# Patient Record
(demographics unavailable — no encounter records)

---

## 2024-12-30 NOTE — IMAGING
[Right] : right knee [Moderate tricompartmental OA medial narrowing] : Moderate tricompartmental OA medial narrowing [Moderate patellofemoral OA] : Moderate patellofemoral OA

## 2024-12-30 NOTE — ASSESSMENT
[FreeTextEntry1] : Reviewed xrays and discussed treatemnt options. He is in not pain so declines CSI today. Aspirated 35cc, normal synovial fluid. Ice, ACE applied. Activity modfication. Follow up prn.

## 2024-12-30 NOTE — PHYSICAL EXAM
[Right] : right knee [NL (140)] : flexion 140 degrees [NL (0)] : extension 0 degrees [] : ligamentously stable

## 2024-12-30 NOTE — PROCEDURE
[Large Joint Injection] : Large joint injection [Right] : of the right [Pain] : pain [Inflammation] : inflammation [X-ray evidence of Osteoarthritis on this or prior visit] : x-ray evidence of Osteoarthritis on this or prior visit [Alcohol] : alcohol [Betadine] : betadine [Effusion] : effusion [] : Patient tolerated procedure well [Call if redness, pain or fever occur] : call if redness, pain or fever occur [Apply ice for 15min out of every hour for the next 12-24 hours as tolerated] : apply ice for 15 minutes out of every hour for the next 12-24 hours as tolerated [Risks, benefits, alternatives discussed / Verbal consent obtained] : the risks benefits, and alternatives have been discussed, and verbal consent was obtained [de-identified] : 35cc

## 2024-12-30 NOTE — HISTORY OF PRESENT ILLNESS
[Right Leg] : right leg [Occasional] : occasional [de-identified] : 12/30/24: 88 y/o male with right knee pain. pt states that he has had right knee pain since he was in his 50's due to several injuries when playing soccer. pt is here today due to swelling in his right knee. pt states that he was here in 2018 and had his knee drained.  [] : no [FreeTextEntry1] : knee  [de-identified] : motion  [de-identified] : aspiration

## 2025-01-07 NOTE — HISTORY OF PRESENT ILLNESS
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 bikes 12 miles daily without issues. 12/23, feeling well. tte showing mild to mod pulm htn. last seen 7/24 feeling ok, exercising regularly.   pt now presents for follow up. today,              denies CP, SOB, at rest or on exertion. Denies palpitations, dizziness, diaphoresis, syncope, LE edema, orthopnea no recent falls pt checks bp at home 120-130s systolic.  Exercise: daily stretching, bikes 12 miles. no symptoms. no decreased exercise tolerance Diet: none  Prev cardiac history: Previous cardiac testing: TTE 2020 LVEF 60%, normal rv function mod LAE Recent labs:  EKG: SR 57 APCs  Med hx: HTN HLD Sx hx: vocal polyp removed, hernia Family hx: B: CAD/MI, M: CAD/MI Social hx: originally from [place], lives in [place], alone. 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

## 2025-01-27 NOTE — PHYSICAL EXAM
[Well Developed] : well developed [Well Nourished] : well nourished [No Acute Distress] : no acute distress [Normal Venous Pressure] : normal venous pressure [Normal S1, S2] : normal S1, S2 [No Murmur] : no murmur [Clear Lung Fields] : clear lung fields [Good Air Entry] : good air entry [No Respiratory Distress] : no respiratory distress  [Soft] : abdomen soft [Non Tender] : non-tender [No Edema] : no edema [Moves all extremities] : moves all extremities [No Focal Deficits] : no focal deficits [Alert and Oriented] : alert and oriented [de-identified] : slow gait

## 2025-01-27 NOTE — REVIEW OF SYSTEMS
[Fever] : no fever [Headache] : no headache [Weight Gain (___ Lbs)] : no recent weight gain [Chills] : no chills [Feeling Fatigued] : not feeling fatigued [Weight Loss (___ Lbs)] : no recent weight loss [Blurry Vision] : no blurred vision [Sore Throat] : no sore throat [SOB] : no shortness of breath [Dyspnea on exertion] : not dyspnea during exertion [Chest Discomfort] : no chest discomfort [Lower Ext Edema] : no extremity edema [Palpitations] : no palpitations [Orthopnea] : no orthopnea [Syncope] : no syncope [Cough] : no cough [Wheezing] : no wheezing [Nausea] : no nausea [Vomiting] : no vomiting [Dizziness] : no dizziness [Easy Bleeding] : no tendency for easy bleeding [Easy Bruising] : no tendency for easy bruising

## 2025-01-27 NOTE — DISCUSSION/SUMMARY
[FreeTextEntry1] : 87M with HTN HLD presents for f/uu  pt feeling well euvolemic EKG showing SR pt exercising daily without issues BP controlled at home 130s systolic  f/u 6 months unless requires sooner 45 min spent on complete encounter, all Q's answered [EKG obtained to assist in diagnosis and management of assessed problem(s)] : EKG obtained to assist in diagnosis and management of assessed problem(s)

## 2025-01-27 NOTE — HISTORY OF PRESENT ILLNESS
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 bikes 12 miles daily without issues. 12/23, feeling well. tte showing mild to mod pulm htn. last seen 7/24 feeling ok, exercising regularly.   pt now presents for follow up. today,  denies CP, SOB, at rest or on exertion. Denies palpitations, dizziness, diaphoresis, syncope, LE edema, orthopnea no recent falls pt checks bp at home 120-130s systolic.  Exercise: daily stretching, bikes 12 miles. no symptoms. no decreased exercise tolerance Diet: none  Prev cardiac history: Previous cardiac testing: TTE 2020 LVEF 60%, normal rv function mod LAE Recent labs:  EKG: SR 59  Med hx: HTN HLD Sx hx: vocal polyp removed, hernia Family hx: B: CAD/MI, M: CAD/MI Social hx: originally from [place], lives in [place], alone. 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